# Patient Record
Sex: FEMALE | Race: WHITE
[De-identification: names, ages, dates, MRNs, and addresses within clinical notes are randomized per-mention and may not be internally consistent; named-entity substitution may affect disease eponyms.]

---

## 2018-07-07 ENCOUNTER — HOSPITAL ENCOUNTER (EMERGENCY)
Dept: HOSPITAL 75 - ER | Age: 28
Discharge: HOME | End: 2018-07-07
Payer: SELF-PAY

## 2018-07-07 VITALS — WEIGHT: 140 LBS | BODY MASS INDEX: 26.43 KG/M2 | HEIGHT: 61 IN

## 2018-07-07 VITALS — SYSTOLIC BLOOD PRESSURE: 120 MMHG | DIASTOLIC BLOOD PRESSURE: 83 MMHG

## 2018-07-07 DIAGNOSIS — D64.9: ICD-10-CM
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
## 2018-07-07 DIAGNOSIS — S80.861A: Primary | ICD-10-CM

## 2018-07-07 DIAGNOSIS — Z87.442: ICD-10-CM

## 2018-07-07 DIAGNOSIS — Z82.49: ICD-10-CM

## 2018-07-07 DIAGNOSIS — Z87.59: ICD-10-CM

## 2018-07-07 PROCEDURE — 86668 FRANCISELLA TULARENSIS: CPT

## 2018-07-07 PROCEDURE — 86618 LYME DISEASE ANTIBODY: CPT

## 2018-07-07 PROCEDURE — 86666 EHRLICHIA ANTIBODY: CPT

## 2018-07-07 PROCEDURE — 36415 COLL VENOUS BLD VENIPUNCTURE: CPT

## 2018-07-07 PROCEDURE — 99283 EMERGENCY DEPT VISIT LOW MDM: CPT

## 2018-07-07 PROCEDURE — 86757 RICKETTSIA ANTIBODY: CPT

## 2018-07-07 NOTE — ED INTEGUMENTARY GENERAL
General


Chief Complaint:  Skin/Wound Problems


Stated Complaint:  SOME KIND OF BUG BITE RIGHT LEG





History of Present Illness


Date Seen by Provider:  2018


Time Seen by Provider:  17:05


Initial Comments


28-year-old female noted a bite to her right popliteal region. Over the last 2 

days it has progressively gotten worse with increased erythema, bruising at the 

center and pain.


Timing/Duration:  yesterday, getting worse


Possible Cause:  insect bite (unsure if it was a spider, tick or other insect)


Associated Symptoms:  denies symptoms





Allergies and Home Medications


Allergies


Coded Allergies:  


     No Known Allergies (Verified  Allergy, Unknown, 08)





Home Medications


Ciprofloxacin HCl 500 Mg Tablet, 500 MG PO BID


   Prescribed by: MERY MEADE on 18


Cyclobenzaprine HCl 10 Mg Tablet, 10 MG PO PRN, (Reported)


Docusate Sodium 100 Mg Capsule, 100 MG PO BID PRN for CONSTIPATION


   Prescribed by: BROOKE GRANDE on 10/13/16 0838


Ferrous Sulfate 325 Mg Tablet, 325 MG PO BID WITH MEALS


   Prescribed by: BROOKE GRANDE on 10/13/16 0838


Hydrocodone Bit/Acetaminophen 1 Each Tablet, 0 TAB PO Q4H PRN for PAIN


   Prescribed by: BROOKE GRANDE on 10/13/16 0838


Ibuprofen 600 Mg Tablet, 600 MG PO Q6H


   Prescribed by: BROOKE GRANDE on 10/13/16 0838


Magnesium Oxide 400 Mg Tablet, 400 MG PO DAILY, (Reported)


Potassium Chloride 10 Meq Tab.er.prt, 10 MEQ PO DAILY, (Reported)


Prenatal Vits W-Ca,Fe,Fa(<1MG) 1 Each Tablet, 1 EACH PO DAILY, (Reported)


Promethazine HCl 25 Mg Tablet, 25 MG PO Q4H PRN for NAUSEA/VOMITING, (Reported)





Patient Home Medication List


Home Medication List Reviewed:  Yes





Constitutional:  no symptoms reported, see HPI


Skin:  see HPI, change in color, lesions (right popliteal space)





Past Medical-Social-Family Hx


Past Med/Social Hx:  Reviewed Nursing Past Med/Soc Hx


Patient Social History


Alcohol Use:  Denies Use


Recreational Drug Use:  No


Smoking Status:  Never a Smoker


Recent Foreign Travel:  No


Contact w/Someone Who Travel:  No


Recent Hopitalizations:  No





Immunizations Up To Date


Tetanus Booster (TDap):  Less than 5yrs


PED Vaccines UTD:  Yes


Date of Influenza Vaccine:  Oct 2, 2016





Seasonal Allergies


Seasonal Allergies:  No





Past Medical History


Surgeries:  Yes


 Section


Respiratory:  No


Cardiac:  No


Neurological:  No


Reproductive Disorders:  No


Kidney Stones


Gastrointestinal:  No


Musculoskeletal:  No


Endocrine:  No


Loss of Vision:  Denies


Hearing Impairment:  Denies


Cancer:  No


Psychosocial:  No


Integumentary:  No


Blood Disorders:  Yes (ANEMIA)


Adverse Reaction/Blood Tranf:  No (hx of transfusion after 1st delivery)





Family Medical History





Arthritis


  19 FATHER


  19 MOTHER


Asthma


  G8 BROTHER


  G8 SISTER


Cardiovascular disease


  19 FATHER


  19 MOTHER


Dementia


  19 MOTHER


Diabetes mellitus


  19 MOTHER


Hypertension


  19 FATHER


  19 MOTHER


  G8 BROTHER


Kidney disease


  G8 BROTHER





Physical Exam


Vital Signs





Vital Signs - First Documented








 18





 17:03


 


Temp 97.4


 


Pulse 91


 


Resp 18


 


B/P (MAP) 120/83 (95)


 


Pulse Ox 98





Capillary Refill :


General Appearance:  WD/WN, no apparent distress


Cardiovascular:  normal peripheral pulses, regular rate, rhythm


Respiratory:  chest non-tender, lungs clear, normal breath sounds


Gastrointestinal:  normal bowel sounds, non tender, soft


Extremities:  normal range of motion, non-tender, normal capillary refill


Neurologic/Psychiatric:  no motor/sensory deficits, alert, normal mood/affect, 

oriented x 3


Skin:  normal color, warm/dry


Skin Problem Location:  lower extremities (right popliteal)


Skin Problem Character:  erythema, papules (annular lesion right popliteal 

space measuring 5 x 6 cm, entire lesion erythematous with central puncture 

site. Mild warmth, no induration or fluctuance. No pruritus and minimal pain)


Lymphatic:  no adenopathy; No inguinal node tender (R)





Progress/Results/Core Measures


Results/Orders


Lab Results





Laboratory Tests








Test


 18


17:19 Range/Units


 








My Orders





Orders - MERY MEADE


Tick Panel With Lyme Eia (18 17:13)


Ibuprofen  Tablet (Motrin  Tablet) (18 17:13)





Vital Signs/I&O











 18





 17:03 17:41


 


Temp 97.4 97.4


 


Pulse 91 91


 


Resp 18 18


 


B/P (MAP) 120/83 (95) 120/83 (95)


 


Pulse Ox 98 98











Departure


Impression





 Primary Impression:  


 Insect bite of right lower extremity


 Qualified Codes:  S80.861A - Insect bite (nonvenomous), right lower leg, 

initial encounter; W57.XXXA - Bitten or stung by nonvenomous insect and other 

nonvenomous arthropods, initial encounter


Disposition:   HOME, SELF-CARE


Condition:  Improved





Departure-Patient Inst.


Decision time for Depature:  17:20


Referrals:  


AFRICA MEDINA DO (PCP/Family)


Primary Care Physician


Patient Instructions:  Cellulitis (Skin Infection), Adult (DC), Insect Bites 

and Stings (DC)





Add. Discharge Instructions:  


Take Cipro as prescribed.


Follow up with your primary care provider or the walk-in clinic at [facility] in 2-3 days.


Apply ice packs to the right posterior knee 20 minutes every 2 hours. Do not 

apply any heat to this area.


You may alternate every 4 hours between Tylenol 650 mg and ibuprofen 600 mg for 

fever or pain.


Return to emergency department for new, acute health care problems.





All discharge instructions reviewed with patient and/or family. Voiced 

understanding.


Scripts


Ciprofloxacin HCl (Cipro) 500 Mg Tablet


500 MG PO BID, #20 TAB 0 Refills


   Prov: MERY MEADE         18


Work/School Note:  Work Release Form   Date Seen in the Emergency Department:  

2018


   Return to Work:  2018


   Restrictions:  No Restrictions











MERY MEADE 2018 17:19